# Patient Record
Sex: FEMALE | URBAN - METROPOLITAN AREA
[De-identification: names, ages, dates, MRNs, and addresses within clinical notes are randomized per-mention and may not be internally consistent; named-entity substitution may affect disease eponyms.]

---

## 2022-09-22 ENCOUNTER — LAB REQUISITION (OUTPATIENT)
Dept: LAB | Facility: CLINIC | Age: 27
End: 2022-09-22

## 2022-09-22 DIAGNOSIS — Z36.9 ENCOUNTER FOR ANTENATAL SCREENING, UNSPECIFIED: ICD-10-CM

## 2022-09-22 DIAGNOSIS — Z87.59 PERSONAL HISTORY OF OTHER COMPLICATIONS OF PREGNANCY, CHILDBIRTH AND THE PUERPERIUM: ICD-10-CM

## 2022-09-22 LAB
ALBUMIN MFR UR ELPH: <6 MG/DL
ALT SERPL W P-5'-P-CCNC: 33 U/L (ref 10–35)
AST SERPL W P-5'-P-CCNC: 25 U/L (ref 10–35)
BASOPHILS # BLD AUTO: 0 10E3/UL (ref 0–0.2)
BASOPHILS NFR BLD AUTO: 0 %
CREAT SERPL-MCNC: 0.69 MG/DL (ref 0.51–0.95)
CREAT UR-MCNC: 32.9 MG/DL
EOSINOPHIL # BLD AUTO: 0 10E3/UL (ref 0–0.7)
EOSINOPHIL NFR BLD AUTO: 0 %
ERYTHROCYTE [DISTWIDTH] IN BLOOD BY AUTOMATED COUNT: 12 % (ref 10–15)
GFR SERPL CREATININE-BSD FRML MDRD: >90 ML/MIN/1.73M2
HCT VFR BLD AUTO: 41.6 % (ref 35–47)
HGB BLD-MCNC: 14.5 G/DL (ref 11.7–15.7)
HOLD SPECIMEN: NORMAL
HOLD SPECIMEN: NORMAL
IMM GRANULOCYTES # BLD: 0 10E3/UL
IMM GRANULOCYTES NFR BLD: 0 %
LYMPHOCYTES # BLD AUTO: 3 10E3/UL (ref 0.8–5.3)
LYMPHOCYTES NFR BLD AUTO: 25 %
MCH RBC QN AUTO: 30.3 PG (ref 26.5–33)
MCHC RBC AUTO-ENTMCNC: 34.9 G/DL (ref 31.5–36.5)
MCV RBC AUTO: 87 FL (ref 78–100)
MONOCYTES # BLD AUTO: 0.6 10E3/UL (ref 0–1.3)
MONOCYTES NFR BLD AUTO: 5 %
NEUTROPHILS # BLD AUTO: 8.5 10E3/UL (ref 1.6–8.3)
NEUTROPHILS NFR BLD AUTO: 70 %
NRBC # BLD AUTO: 0 10E3/UL
NRBC BLD AUTO-RTO: 0 /100
PLATELET # BLD AUTO: 312 10E3/UL (ref 150–450)
PROT/CREAT 24H UR: NORMAL MG/G{CREAT}
RBC # BLD AUTO: 4.79 10E6/UL (ref 3.8–5.2)
WBC # BLD AUTO: 12.2 10E3/UL (ref 4–11)

## 2022-09-22 PROCEDURE — 86901 BLOOD TYPING SEROLOGIC RH(D): CPT | Performed by: NURSE PRACTITIONER

## 2022-09-22 PROCEDURE — 87086 URINE CULTURE/COLONY COUNT: CPT | Performed by: NURSE PRACTITIONER

## 2022-09-22 PROCEDURE — 84460 ALANINE AMINO (ALT) (SGPT): CPT | Performed by: NURSE PRACTITIONER

## 2022-09-22 PROCEDURE — 86780 TREPONEMA PALLIDUM: CPT | Performed by: NURSE PRACTITIONER

## 2022-09-22 PROCEDURE — 82565 ASSAY OF CREATININE: CPT | Performed by: NURSE PRACTITIONER

## 2022-09-22 PROCEDURE — 87340 HEPATITIS B SURFACE AG IA: CPT | Performed by: NURSE PRACTITIONER

## 2022-09-22 PROCEDURE — 87389 HIV-1 AG W/HIV-1&-2 AB AG IA: CPT | Performed by: NURSE PRACTITIONER

## 2022-09-22 PROCEDURE — 85025 COMPLETE CBC W/AUTO DIFF WBC: CPT | Performed by: NURSE PRACTITIONER

## 2022-09-22 PROCEDURE — 87491 CHLMYD TRACH DNA AMP PROBE: CPT | Performed by: NURSE PRACTITIONER

## 2022-09-22 PROCEDURE — 86803 HEPATITIS C AB TEST: CPT | Performed by: NURSE PRACTITIONER

## 2022-09-22 PROCEDURE — 86762 RUBELLA ANTIBODY: CPT | Performed by: NURSE PRACTITIONER

## 2022-09-22 PROCEDURE — 84156 ASSAY OF PROTEIN URINE: CPT | Performed by: NURSE PRACTITIONER

## 2022-09-22 PROCEDURE — 84450 TRANSFERASE (AST) (SGOT): CPT | Performed by: NURSE PRACTITIONER

## 2022-09-23 LAB
ABO/RH(D): NORMAL
ANTIBODY SCREEN: NEGATIVE
C TRACH DNA SPEC QL PROBE+SIG AMP: NEGATIVE
HBV SURFACE AG SERPL QL IA: NONREACTIVE
HCV AB SERPL QL IA: NONREACTIVE
HIV 1+2 AB+HIV1 P24 AG SERPL QL IA: NONREACTIVE
N GONORRHOEA DNA SPEC QL NAA+PROBE: NEGATIVE
RUBV IGG SERPL QL IA: 3.8 INDEX
RUBV IGG SERPL QL IA: POSITIVE
SPECIMEN EXPIRATION DATE: NORMAL
T PALLIDUM AB SER QL: NONREACTIVE

## 2022-09-24 LAB — BACTERIA UR CULT: NORMAL

## 2022-12-15 ENCOUNTER — LAB REQUISITION (OUTPATIENT)
Dept: LAB | Facility: CLINIC | Age: 27
End: 2022-12-15
Payer: COMMERCIAL

## 2022-12-15 DIAGNOSIS — R30.0 DYSURIA: ICD-10-CM

## 2022-12-15 PROCEDURE — 87086 URINE CULTURE/COLONY COUNT: CPT | Mod: ORL | Performed by: NURSE PRACTITIONER

## 2022-12-17 LAB — BACTERIA UR CULT: NORMAL

## 2023-04-11 ENCOUNTER — LAB REQUISITION (OUTPATIENT)
Dept: LAB | Facility: CLINIC | Age: 28
End: 2023-04-11
Payer: COMMERCIAL

## 2023-04-11 DIAGNOSIS — Z36.85 ENCOUNTER FOR ANTENATAL SCREENING FOR STREPTOCOCCUS B: ICD-10-CM

## 2023-04-11 PROCEDURE — 87653 STREP B DNA AMP PROBE: CPT | Mod: ORL | Performed by: PHYSICIAN ASSISTANT

## 2023-04-11 PROCEDURE — 87077 CULTURE AEROBIC IDENTIFY: CPT | Mod: ORL | Performed by: PHYSICIAN ASSISTANT

## 2023-04-12 LAB — GP B STREP DNA SPEC QL NAA+PROBE: POSITIVE

## 2023-04-15 LAB — BACTERIA SPEC CULT: ABNORMAL

## 2024-10-14 ENCOUNTER — LAB REQUISITION (OUTPATIENT)
Dept: LAB | Facility: CLINIC | Age: 29
End: 2024-10-14
Payer: COMMERCIAL

## 2024-10-14 DIAGNOSIS — Z13.29 ENCOUNTER FOR SCREENING FOR OTHER SUSPECTED ENDOCRINE DISORDER: ICD-10-CM

## 2024-10-14 DIAGNOSIS — Z13.1 ENCOUNTER FOR SCREENING FOR DIABETES MELLITUS: ICD-10-CM

## 2024-10-14 DIAGNOSIS — N96 RECURRENT PREGNANCY LOSS: ICD-10-CM

## 2024-10-14 LAB
EST. AVERAGE GLUCOSE BLD GHB EST-MCNC: 111 MG/DL
HBA1C MFR BLD: 5.5 %
HCG INTACT+B SERPL-ACNC: 3 MIU/ML
MIS SERPL-MCNC: 3.05 NG/ML (ref 0.58–8.1)
TSH SERPL DL<=0.005 MIU/L-ACNC: 1.52 UIU/ML (ref 0.3–4.2)

## 2024-10-14 PROCEDURE — 82166 ASSAY ANTI-MULLERIAN HORM: CPT | Mod: ORL | Performed by: OBSTETRICS & GYNECOLOGY

## 2024-10-14 PROCEDURE — 84702 CHORIONIC GONADOTROPIN TEST: CPT | Mod: ORL | Performed by: OBSTETRICS & GYNECOLOGY

## 2024-10-14 PROCEDURE — 84443 ASSAY THYROID STIM HORMONE: CPT | Mod: ORL | Performed by: OBSTETRICS & GYNECOLOGY

## 2024-10-14 PROCEDURE — 86376 MICROSOMAL ANTIBODY EACH: CPT | Mod: ORL | Performed by: OBSTETRICS & GYNECOLOGY

## 2024-10-14 PROCEDURE — 83036 HEMOGLOBIN GLYCOSYLATED A1C: CPT | Mod: ORL | Performed by: OBSTETRICS & GYNECOLOGY

## 2024-10-15 LAB — THYROPEROXIDASE AB SERPL-ACNC: 13 IU/ML

## 2024-11-25 ENCOUNTER — LAB REQUISITION (OUTPATIENT)
Dept: LAB | Facility: CLINIC | Age: 29
End: 2024-11-25
Payer: COMMERCIAL

## 2024-11-25 ENCOUNTER — TRANSFERRED RECORDS (OUTPATIENT)
Dept: HEALTH INFORMATION MANAGEMENT | Facility: CLINIC | Age: 29
End: 2024-11-25

## 2024-11-25 DIAGNOSIS — O30.049 TWIN PREGNANCY, DICHORIONIC/DIAMNIOTIC, UNSPECIFIED TRIMESTER: ICD-10-CM

## 2024-11-25 LAB
ALBUMIN MFR UR ELPH: 7.8 MG/DL
BASOPHILS # BLD AUTO: 0 10E3/UL (ref 0–0.2)
BASOPHILS NFR BLD AUTO: 0 %
CREAT UR-MCNC: 91.2 MG/DL
EOSINOPHIL # BLD AUTO: 0.1 10E3/UL (ref 0–0.7)
EOSINOPHIL NFR BLD AUTO: 1 %
ERYTHROCYTE [DISTWIDTH] IN BLOOD BY AUTOMATED COUNT: 12.4 % (ref 10–15)
EST. AVERAGE GLUCOSE BLD GHB EST-MCNC: 105 MG/DL
HBA1C MFR BLD: 5.3 %
HCT VFR BLD AUTO: 41 % (ref 35–47)
HGB BLD-MCNC: 14 G/DL (ref 11.7–15.7)
HIV 1+2 AB+HIV1 P24 AG SERPL QL IA: NONREACTIVE
HOLD SPECIMEN: NORMAL
IMM GRANULOCYTES # BLD: 0 10E3/UL
IMM GRANULOCYTES NFR BLD: 0 %
LYMPHOCYTES # BLD AUTO: 3.2 10E3/UL (ref 0.8–5.3)
LYMPHOCYTES NFR BLD AUTO: 28 %
MCH RBC QN AUTO: 29.1 PG (ref 26.5–33)
MCHC RBC AUTO-ENTMCNC: 34.1 G/DL (ref 31.5–36.5)
MCV RBC AUTO: 85 FL (ref 78–100)
MONOCYTES # BLD AUTO: 0.8 10E3/UL (ref 0–1.3)
MONOCYTES NFR BLD AUTO: 7 %
NEUTROPHILS # BLD AUTO: 7.4 10E3/UL (ref 1.6–8.3)
NEUTROPHILS NFR BLD AUTO: 64 %
NRBC # BLD AUTO: 0 10E3/UL
NRBC BLD AUTO-RTO: 0 /100
PLATELET # BLD AUTO: 293 10E3/UL (ref 150–450)
PROT/CREAT 24H UR: 0.09 MG/MG CR (ref 0–0.2)
RBC # BLD AUTO: 4.81 10E6/UL (ref 3.8–5.2)
WBC # BLD AUTO: 11.5 10E3/UL (ref 4–11)

## 2024-11-25 PROCEDURE — 86762 RUBELLA ANTIBODY: CPT | Mod: ORL | Performed by: ADVANCED PRACTICE MIDWIFE

## 2024-11-25 PROCEDURE — 86780 TREPONEMA PALLIDUM: CPT | Mod: ORL | Performed by: ADVANCED PRACTICE MIDWIFE

## 2024-11-25 PROCEDURE — 87340 HEPATITIS B SURFACE AG IA: CPT | Performed by: ADVANCED PRACTICE MIDWIFE

## 2024-11-25 PROCEDURE — 84460 ALANINE AMINO (ALT) (SGPT): CPT | Performed by: ADVANCED PRACTICE MIDWIFE

## 2024-11-25 PROCEDURE — 85004 AUTOMATED DIFF WBC COUNT: CPT | Performed by: ADVANCED PRACTICE MIDWIFE

## 2024-11-25 PROCEDURE — 85041 AUTOMATED RBC COUNT: CPT | Performed by: ADVANCED PRACTICE MIDWIFE

## 2024-11-25 PROCEDURE — 86762 RUBELLA ANTIBODY: CPT | Performed by: ADVANCED PRACTICE MIDWIFE

## 2024-11-25 PROCEDURE — 84450 TRANSFERASE (AST) (SGOT): CPT | Performed by: ADVANCED PRACTICE MIDWIFE

## 2024-11-25 PROCEDURE — 86803 HEPATITIS C AB TEST: CPT | Performed by: ADVANCED PRACTICE MIDWIFE

## 2024-11-25 PROCEDURE — 82565 ASSAY OF CREATININE: CPT | Performed by: ADVANCED PRACTICE MIDWIFE

## 2024-11-25 PROCEDURE — 87086 URINE CULTURE/COLONY COUNT: CPT | Mod: ORL | Performed by: ADVANCED PRACTICE MIDWIFE

## 2024-11-25 PROCEDURE — 83036 HEMOGLOBIN GLYCOSYLATED A1C: CPT | Performed by: ADVANCED PRACTICE MIDWIFE

## 2024-11-25 PROCEDURE — 86780 TREPONEMA PALLIDUM: CPT | Performed by: ADVANCED PRACTICE MIDWIFE

## 2024-11-25 PROCEDURE — 86901 BLOOD TYPING SEROLOGIC RH(D): CPT | Performed by: ADVANCED PRACTICE MIDWIFE

## 2024-11-25 PROCEDURE — 87086 URINE CULTURE/COLONY COUNT: CPT | Performed by: ADVANCED PRACTICE MIDWIFE

## 2024-11-25 PROCEDURE — 84156 ASSAY OF PROTEIN URINE: CPT | Performed by: ADVANCED PRACTICE MIDWIFE

## 2024-11-25 PROCEDURE — 87389 HIV-1 AG W/HIV-1&-2 AB AG IA: CPT | Performed by: ADVANCED PRACTICE MIDWIFE

## 2024-11-26 LAB
ABO + RH BLD: NORMAL
ALT SERPL W P-5'-P-CCNC: 29 U/L (ref 0–50)
AST SERPL W P-5'-P-CCNC: 18 U/L (ref 0–45)
BLD GP AB SCN SERPL QL: NEGATIVE
CREAT SERPL-MCNC: 0.64 MG/DL (ref 0.51–0.95)
EGFRCR SERPLBLD CKD-EPI 2021: >90 ML/MIN/1.73M2
HBV SURFACE AG SERPL QL IA: NONREACTIVE
HCV AB SERPL QL IA: NONREACTIVE
RUBV IGG SERPL QL IA: 3.46 INDEX
RUBV IGG SERPL QL IA: POSITIVE
SPECIMEN EXP DATE BLD: NORMAL
T PALLIDUM AB SER QL: NONREACTIVE

## 2024-11-27 LAB — BACTERIA UR CULT: NORMAL

## 2024-12-23 ENCOUNTER — TRANSFERRED RECORDS (OUTPATIENT)
Dept: HEALTH INFORMATION MANAGEMENT | Facility: CLINIC | Age: 29
End: 2024-12-23
Payer: COMMERCIAL

## 2024-12-23 ENCOUNTER — TRANSCRIBE ORDERS (OUTPATIENT)
Dept: MATERNAL FETAL MEDICINE | Facility: CLINIC | Age: 29
End: 2024-12-23
Payer: COMMERCIAL

## 2024-12-23 ENCOUNTER — MEDICAL CORRESPONDENCE (OUTPATIENT)
Dept: HEALTH INFORMATION MANAGEMENT | Facility: CLINIC | Age: 29
End: 2024-12-23
Payer: COMMERCIAL

## 2024-12-23 DIAGNOSIS — O26.90 PREGNANCY RELATED CONDITION, ANTEPARTUM: Primary | ICD-10-CM

## 2025-02-07 ENCOUNTER — TRANSFERRED RECORDS (OUTPATIENT)
Dept: HEALTH INFORMATION MANAGEMENT | Facility: CLINIC | Age: 30
End: 2025-02-07
Payer: COMMERCIAL

## 2025-02-10 ENCOUNTER — OFFICE VISIT (OUTPATIENT)
Dept: MATERNAL FETAL MEDICINE | Facility: CLINIC | Age: 30
End: 2025-02-10
Attending: OBSTETRICS & GYNECOLOGY
Payer: COMMERCIAL

## 2025-02-10 ENCOUNTER — HOSPITAL ENCOUNTER (OUTPATIENT)
Dept: ULTRASOUND IMAGING | Facility: CLINIC | Age: 30
Discharge: HOME | End: 2025-02-10
Attending: OBSTETRICS & GYNECOLOGY
Payer: COMMERCIAL

## 2025-02-10 DIAGNOSIS — Z36.2 ENCOUNTER FOR FOLLOW-UP ULTRASOUND OF FETAL ANATOMY: ICD-10-CM

## 2025-02-10 DIAGNOSIS — O26.90 PREGNANCY RELATED CONDITION, ANTEPARTUM: ICD-10-CM

## 2025-02-10 DIAGNOSIS — O30.042 DICHORIONIC DIAMNIOTIC TWIN PREGNANCY IN SECOND TRIMESTER: Primary | ICD-10-CM

## 2025-02-10 PROCEDURE — 96041 GENETIC COUNSELING SVC EA 30: CPT

## 2025-02-10 PROCEDURE — 76812 OB US DETAILED ADDL FETUS: CPT

## 2025-02-10 NOTE — PROGRESS NOTES
[facility]  Genetic Counseling Consult    Patient:  Perla East  Preferred Name: Perla YOB: 1995   Date of Service:  2/10/25   MRN: [id number]    Perla was seen at the [facility] for genetic consultation. The indication for genetic counseling is  di/di twins and previous pregnancy with triploidy . The patient was accompanied to this visit by their partner, Pa.    The session was conducted in English.      IMPRESSION/ PLAN   1. Perla had genetic screening earlier in this pregnancy. Their non-invasive prenatal test was screen negative or low risk for screened conditions     2. During today's [facility] visit, Perla had a genetic counseling session only. Perla has already had genetic testing in this pregnancy. Additional screening and diagnostic testing was discussed for the gestational age and declined.    3. Since the patient chose aneuploidy screening via NIPT, quad screen is NOT recommended in the second trimester. If the patient desires screening for open neural tube defects, maternal serum AFP only is recommended, ideally between 16-18 weeks gestation.    4. Perla had a level II comprehensive anatomy ultrasound today. Please see the ultrasound report for further details.    5.  Further recommendation includes a follow up fetal growth scan with MFM in 4 weeks to evaluate anatomy not seen today. This appointment has not been scheduled yet and our schedulers will call to make this appointment or Perla can call our schedulers at [phone] to make an appointment.     PREGNANCY HISTORY   /Parity:       Perla's pregnancy history is significant for:   Two previous term vag-spont deliveries. Two previous early SAB around 6 weeks and one D&C for a loss around 8 weeks, POC testing was positive for triploidy.     CURRENT PREGNANCY   Current Age: 29 year old   Age at Delivery: 29  "year old  MICHAEL: 7/6/2025, by Ultrasound                                   Gestational Age: 19w1d  This pregnancy is a dichorionic diamniotic twin gestation.   Twin pregnancies are described by the number of placentas (-chorionic) and amniotic sacs (-amniotic). In addition, twin pregnancies are also characterized by the number (mono- or di-) of zygotes (fertilized egg) the pregnancy developed from.  This pregnancy is a dichorionic diamniotic twin pregnancy which means the babies have separate placentas and separate amniotic sacs. Due to being dichorionic, there is a 20-30% chance the twins are monozygotic, or genetically identical and a 70-80% chance the twins are dizygotic, or NOT genetically identical. If the twins are discordant in sex (one male, one female) they are most likely dizygotic. If they are the same sex, the zygosity is unclear. There is a screening option called cell-free DNA that can determine zygosity with certain technology.   This pregnancy was conceived spontaneously.    MEDICAL HISTORY   Perla s reported medical history is not expected to impact pregnancy management or risks to fetal development.       FAMILY HISTORY   A three-generation pedigree was obtained today and is scanned under the \"Media\" tab in Epic. The family history was reported by Perla and their partner.    The following significant findings were reported today:   Perla has had three pregnancy losses. Her last pregnancy had POC testing done showing triploidy.   Perla has a sister who had a son born missing half of one arm. He was found to be a carrier for TAR syndrome after an extensive workup. His mother was also found to be a carrier.  Perla's maternal aunt had ovarian cancer and passed at 58. She was BRCA+, Perla's mother had negative BRCA testing.   Perla's maternal grandmother had pancreatic cancer and passed at 72.  Perla's paternal grandmother had lung cancer and passed in her 60's, history of " "smoking.  Perla's paternal grandfather had an aortic aneurysm and passed at 70, history of smoking.   Pa's mother had breast cancer and passed at 52. She reportedly had negative testing so Pa did not have to get testing or screening.   Pa's maternal aunt had breast cancer in her 60's.   Pa's maternal grandmother had an unknown cancer and passed around 70.  Pa's father has pre diabetes.  Pa has a maternal aunt that passed around 18 months old, possibly due to an illness.  Pa's paternal grandfather passed at 70 during a heart surgery, Pa's father reportedly had negative testing for conditions related to his father's passing.     Family history of cancer    We discussed the family history of multiple cancers briefly. Cancer most often occurs by chance, however some families seem to develop cancer more frequently than expected. Everyone has a risk to develop cancer, but individuals may be at an increased risk to develop cancer based on their family history. Genetic counseling is available for cancer syndromes. Cancer family history, even without genetic testing, can change cancer screening recommendations for family members and aid in insurance coverage for access to them as well. The most informative individuals to complete cancer genetic counseling and genetic testing are those with a personal history of cancer or those closely related to the affected individuals. With Perla's maternal aunt having a BRCA mutation but Perla's mother having negative testing, we would not expect Perla to be at an increased risk of hereditary breast and ovarian cancers.     If the family wants more information they can contact the [facility] Cancer Risk Management Program ([phone]). Physicians can also make referrals at [email or web address] or, if within the [employer] system, through [facility] referral for \"Cancer Risk Mgmt/Cancer Genetic " "Counseling\"     Due to Danny's family histories of cancer it may be reasonable to begin early screening and surveillance such as mammograms for example. Screening mammograms are usually not recommended during pregnancy or while breast feeding including up to six months after. They were encouraged to discuss this family history with their medical providers to ensure that screening begins at appropriate ages.     Family history of recurrent pregnancy loss    Recurrent miscarriage is defined as three or more clinically recognized consecutive or non-consecutive pregnancy losses occurring prior to fetal viability (<24 weeks). Perla's personal history is significant for 3 pregnancy losses around 6-8 weeks.      At least 10-15% of the recognized pregnancies end in miscarriage, with most losses occurring in the first trimester. The rate of miscarriage less than 8 weeks gestation may be even greater as some women may not recognize that they are pregnant. Around half of miscarriages that occur before 20 weeks gestation are caused by chromosome abnormalities. Of these chromosomally atypical fetuses, ~ approximately 60% have an autosomal trisomy with trisomy 16 being the most common. Monosomy X is the second most common chromosomal etiology, accounting for ~20%, and triploidy accounts for ~15%. Polyploidies and structural rearrangements constitute the remainder. The risk for a miscarriage increases with advancing maternal age due to a higher incidence of conceptuses with a chromosomal aneuploidy. The risk may approach 75% in women who are 45 years of age and older.     Familial chromosome rearrangements can lead to a history of recurrent pregnancy loss or infertility. They can also increase the potential risk for a stillbirth or liveborn babies with birth differences, developmental concerns, or other health problems. In this case a parent would carry a balanced rearrangement with an equal exchange of chromosome " material. An individual with a balanced translocation is typically healthy. However, a child of this individual may inherit one of the rearranged chromosomes, along with typical chromosomes, resulting in an unbalanced rearrangement with total loss and gain of chromosome material. If a conceptus has unbalanced chromosome material it could lead to miscarriages or an affected child. This result can be dependent on the amount of material that is gained or loss and what chromosome the material is from. If a chromosome translocation is inherited through a family there are typically multiple individuals over multiple generations with recurrent pregnancy loss and/or affected individuals. However, it is also possible for a de felicity translocation or other rearrangement to occur. For couples who have experienced three or more unexplained pregnancy losses, it has been estimated that around 2-5% of these couples may carry a rearrangement. Chromosome rearrangements causing pregnancy loss are more common in the female partner. Chromosome rearrangements in males are more likely to cause infertility. Chromosome analysis on parents is possible by obtaining a karyotype on peripheral blood.     Other genetic causes of recurrent loss could include lethal autosomal recessive conditions of x-linked dominant conditions. Non-genetic causes of pregnancy loss can include maternal uterine anomalies, endocrine concerns such as diabetes or thyroid disease, antiphospholipid syndrome, and environmental agents.     In about 50% of couples with recurrent pregnancy loss, the etiology remains unknown despite a thorough evaluation and is therefore classified as idiopathic. It is estimated that couples with idiopathic recurrent pregnacy loss can have up to a 75% chance of having a successful pregnancy. We discussed parental balanced translocation testing and they were not interested at this time.     Family history of heart condition     We reviewed that  heart conditions are relatively common in the general population. Heart disease is often multifactorial with many possible underlying factors contributing to heart disease. It is unknown if the relative's heart condition could have an underlying genetic predisposition. Pa was encouraged to bring up this family history with his primary care physician in order to receive the proper care and screening at appropriate ages.     Family history of aortic aneurysm    Spontaneous coronary artery dissection (SCAD) is a condition that occurs when a tear forms in a wall of a heart artery. This can slow or block blood flow to the heart, causing a heart attack, heart rhythm problems or sudden death. There are some genetic conditions such as Marfan syndrome, Loeys Michaelle syndrome, and other connective tissue disorders that can affect the heart and cause sudden aortic dissection. Without a known genetic cause, it is challenging to provide an accurate risk assessment to Perla or her pregnancy. She was encouraged to bring this family history up with her medical care providers to ensure she receives the proper screening and surveillance. Sudden heart dissections found in younger individuals in the absence of significant smoking or alcohol use may be associated with a hereditary genetic predisposition to the phenotype, and could warrant further evaluation by a clinical , cardiovascular genetic counselor, or cardiovascular healthcare provider.      Family history of SIDS    We discussed how it is possible that Pa's paternal aunt  due to an underlying congenital abnormality (such as a cardiac defect), a genetic syndrome (such as a metabolic diease or other syndrome), or potentially an infection or illness. However, in the absence of a diagnosis it is not possible to determine recurrence risks for their current pregnancy though if it was an illness as discussed and believed, there would not be an increased risk to  their pregnancy.     Family history of radial limb malformation    Radial ray malformations can be isolated or associated with chromosome conditions (trisomies 13 and 18), many genetic syndromes (including but not limited to Zaidi-Heather syndrome, Fanconi anemia, and TAR syndrome), other malformations (such as in VACTERL association), teratogens (maternal diabetes, valproic acid), and vascular disruptions during pregnancy (amniotic bands). Identifying an underlying genetic etiology can be helpful in clarifying prognosis and recurrence risk and may be helpful in planning or making decisions about pregnancy management and recurrence risk for family members. Perla stated her nephew has had an extensive diagnostic workup, he was found to be a carrier for a single TAR syndrome related mutation and Perla's sister was also a carrier. If Perla's sister's genetic variant was inherited from a parent, Perla would also have a 50% chance of being a carrier. It is predicted that about 1/25,000 - 1/50,000 people may be carriers for TAR syndrome. Given Perla has up to a 50% chance of being a carrier, her and Pa could have up to a 1/200,000 - 1/400,000 chance of having a pregnancy with TAR syndrome.     Family history of pre-diabetes    Diabetes is a complex genetic trait (a multifactorial condition) caused by the combination of genetic and environmental factors. Having a family history of diabetes can increase one's risk of also developing diabetes. For men with type I diabetes, their children have a 1 in 17 chance of developing diabetes during their lifetime. However, for woman with type I diabetes, if their child is born before 25 there is a 1 in 25 risk and if their child was born after 25 there is a 1 in 100 risk. If a parent develops diabetes before age 11, their child's risk is typically doubled. Furthermore, if both parents have type I diabetes, the risk is between 1 in 10 to 1 in 4. These risk numbers are an  estimate and can be complicated by many other factors such as autoimmune disorders and lifestyle choices. Therefore, there is currently no prenatal genetic testing we would offer the patient at this time to assess for diabetes risks in the current pregnancy. We also reviewed that individuals with a family history of type II diabetes are generally thought to be at increased risk to develop type II diabetes. Therefore, it is important for individuals with a family history of type II diabetes to let their physicians to know about this family history, so they can be appropriate screened for diabetes.     Otherwise, the reported family history is unremarkable for multiple miscarriages, stillbirths, birth defects, intellectual disabilities, known genetic conditions, and consanguinity.       RISK ASSESSMENT FOR INHERITED CONDITIONS AND CARRIER SCREENING OPTIONS   Expanded carrier screening is available to screen for autosomal recessive conditions and X-linked conditions in a large list of genes. Carrier screening does not test the pregnancy but gives a risk assessment for the pregnancy and future pregnancies to have the condition. Expanded carrier screening is designed to identify carrier status for conditions that are primarily childhood or adolescent onset. Expanded carrier screening does not evaluate for adult-onset conditions such as hereditary cancer syndromes, dementia/ Alzheimer's disease, or cardiovascular disease risk factors. Additionally, expanded carrier screening is not comprehensive for all known genetic diseases or inherited conditions. Carrier screening does not test for all genetic and health conditions or risk factors.     Autosomal recessive conditions happen when a mutation has been inherited from the egg and sperm and include conditions like cystic fibrosis, thalassemia, hearing loss, spinal muscular atrophy, and more. We reviewed that when both biological parents carry a harmful genetic change in a  gene associated with autosomal recessive inheritance, each of their pregnancies has a 1 in 4 (25%) chance to be affected by that condition. X-linked conditions happen when a mutation has been inherited from the egg and include conditions like fragile X syndrome.With x-linked conditions, the specific risk generally depends on the chromosomal sex of the fetus, with XY individuals (generally male) being most severely affected.      screening was reviewed. About [place] Screening    The patient does have a family history of a known inherited condition. Perla's sister is a carrier for TAR syndrome.     Perla reported that she previously had done carrier screening during a prior pregnancy with her primary care OB provider through [facility]. A copy of the report was not available for review today.     The patient declined the carrier screening options. They are aware the option will remain, and they can contact us if they would like to pursue screening. See below for the more detailed information we dicussed.      RISK ASSESSMENT FOR CHROMOSOME CONDITIONS   We explained that the risk for fetal chromosome abnormalities increases with maternal age. We discussed specific features of common chromosome abnormalities, including trisomy 21 (Down syndrome), trisomy 13, trisomy 18, and sex chromosome trisomies.    At age 29 at midtrimester, the risk to have a baby with Down syndrome is 1 in 597.  At age 29 at midtrimester, the risk to have a baby with any chromosome abnormality is 1 in 299.     Perla had genetic screening earlier in this pregnancy. Their non-invasive prenatal test was screen negative or low risk for screened conditions     Non-invasive prenatal testing (NIPT) results  Maternal plasma cell-free DNA testing  Screens for fetal trisomy 21, trisomy 13, trisomy 18, and sex chromosome aneuploidy  First trimester ultrasound with nuchal translucency and nasal bone assessment was not performed in this  pregnancy, to our knowledge.  Perla had NIPT earlier in pregnancy; we reviewed the results today, which are low risk.  The test included fetal sex prediction and each fetus was predicted to be female.   Given the accuracy of this test, these results greatly decrease the chance for certain fetal chromosome abnormalities  We discussed the limitations of normal NIPT results  Maternal serum AFP only to screen for open neural tube defects (after 15 weeks) was not performed in this pregnancy, to our knowledge.     GENETIC TESTING OPTIONS   Genetic testing during a pregnancy includes screening and diagnostic procedures.      Screening tests are non-invasive which means no risk to the pregnancy and includes ultrasounds and blood work. The benefits and limitations of screening were reviewed. Screening tests provide a risk assessment (chance) specific to the pregnancy for certain fetal chromosome abnormalities but cannot definitively diagnose or exclude a fetal chromosome abnormality. Follow-up genetic counseling and consideration of diagnostic testing is recommended with any abnormal screening result. Diagnostic testing during a pregnancy is more certain and can test for more conditions. However, the tests do have a risk of miscarriage that requires careful consideration. These tests can detect fetal chromosome abnormalities with greater than 99% certainty. Results can be compromised by maternal cell contamination or mosaicism and are limited by the resolution of current genetic testing technology.     There is no screening or diagnostic test that detects all forms of birth defects or intellectual disability.     We discussed the following screening options:     Non-invasive prenatal testing (NIPT)  Also called cell-free DNA screening because it detects chromosomes from the placenta in the pregnant person's blood  Can be done any time after 10 weeks gestation  Standard recommendation for NIPT screens for trisomy 21,  trisomy 18, trisomy 13, with the option of adding sex chromosome aneuploidies, without or without predicted sex  Cannot screen for open neural tube defects, maternal serum AFP after 15 weeks is recommended  New NIPT options include screening for other trisomies, microdeletion syndromes, and in some cases fetal blood antigens. Guidelines do not recommend these conditions are included in standard screening. These options have limitations and should be discussed with a genetic counselor.   However, current (2023) ACMG guidelines do recommend that screening for one microdeletion syndrome, called 22q11.2 deletion syndrome be offered to all pregnant patients. 22q11.2 deletion syndrome has an estimated prevalence of 1 in 990 to 1 in 2148 (0.05-0.1%). Risk is not thought to increase with maternal age. Clinical features are variable but include congenital heart defects, cleft palate, developmental delays, immune system deficiencies, and hearing loss. Approximately 90% of cases are de felicity (a sporadic new change in a pregnancy). Cell-free DNA screening for 22q11.2 deletion syndrome is available with the inclusion of other microdeletion syndromes. There is less data about the performance of cell-free DNA screening for more rare microdeletions and the chance for false positives or negative may be increased.  We discussed the limitations of cell-free DNA screening in detecting microdeletions and the possibility of false positives and false negatives. Microdeletion screening was not discussed due to twin pregnancy.    We discussed the following ultrasound options:    Comprehensive level II ultrasound (Fetal Anatomy Ultrasound)  Ultrasound done between 18-20 weeks gestation  Screens for major birth defects and markers for aneuploidy (like trisomy 21 and trisomy 18)  Includes looking at the fetus/baby's growth, heart, organs (stomach, kidneys), placenta, and amniotic fluid    We discussed the following diagnostic options:      Amniocentesis  Invasive diagnostic procedure done after 15 weeks gestation  The procedure collects a small sample of amniotic fluid for the purpose of chromosomal testing and/or other genetic testing  Diagnostic result; more than 99% sensitivity for fetal chromosome abnormalities  Testing for AFP in the amniotic fluid can test for open neural tube defects    It was a pleasure to be involved with Perla s care. I spent 60 minutes on the date of the encounter doing chart review, obtaining history, test coordination, documentation, and further activities as noted above.    Venkatesh Ruvalcaba GC, MS, [facility]  Board Certified and [place] Licensed Genetic Counselor  [facility]  Maternal Fetal Medicine  Office: [phone]  [facility]: [phone]   Fax: [phone]  [facility]

## 2025-02-10 NOTE — NURSING NOTE
Patient reports positive fetal movement x 2, denies pain, leaking of fluid, or bleeding. Education provided to patient on today's ultrasound.  SBAR given to RAEGAN MG, see their note in Epic.

## 2025-02-10 NOTE — PROGRESS NOTES
Please refer to ultrasound report under 'Imaging' Studies of 'Chart Review' tabs.    Patel Hooks M.D.

## 2025-02-11 ENCOUNTER — LAB REQUISITION (OUTPATIENT)
Dept: LAB | Facility: CLINIC | Age: 30
End: 2025-02-11
Payer: COMMERCIAL

## 2025-02-11 DIAGNOSIS — N94.89 OTHER SPECIFIED CONDITIONS ASSOCIATED WITH FEMALE GENITAL ORGANS AND MENSTRUAL CYCLE: ICD-10-CM

## 2025-02-13 LAB — BACTERIA UR CULT: NORMAL

## 2025-03-16 ENCOUNTER — HEALTH MAINTENANCE LETTER (OUTPATIENT)
Age: 30
End: 2025-03-16